# Patient Record
Sex: FEMALE | Race: WHITE | NOT HISPANIC OR LATINO | Employment: FULL TIME | URBAN - METROPOLITAN AREA
[De-identification: names, ages, dates, MRNs, and addresses within clinical notes are randomized per-mention and may not be internally consistent; named-entity substitution may affect disease eponyms.]

---

## 2018-04-29 ENCOUNTER — OFFICE VISIT (OUTPATIENT)
Dept: URGENT CARE | Facility: CLINIC | Age: 53
End: 2018-04-29
Payer: COMMERCIAL

## 2018-04-29 VITALS
DIASTOLIC BLOOD PRESSURE: 81 MMHG | SYSTOLIC BLOOD PRESSURE: 147 MMHG | OXYGEN SATURATION: 99 % | RESPIRATION RATE: 18 BRPM | HEART RATE: 72 BPM | TEMPERATURE: 97.2 F

## 2018-04-29 DIAGNOSIS — B37.9 YEAST INFECTION: ICD-10-CM

## 2018-04-29 DIAGNOSIS — L24.89 IRRITANT CONTACT DERMATITIS DUE TO OTHER AGENTS: Primary | ICD-10-CM

## 2018-04-29 PROCEDURE — 99203 OFFICE O/P NEW LOW 30 MIN: CPT | Performed by: NURSE PRACTITIONER

## 2018-04-29 RX ORDER — VALSARTAN AND HYDROCHLOROTHIAZIDE 320; 25 MG/1; MG/1
TABLET, FILM COATED ORAL
COMMUNITY
Start: 2018-04-18

## 2018-04-29 RX ORDER — FLUCONAZOLE 150 MG/1
150 TABLET ORAL ONCE
Qty: 1 TABLET | Refills: 0 | Status: SHIPPED | OUTPATIENT
Start: 2018-04-29 | End: 2018-04-29

## 2018-04-29 RX ORDER — PREDNISONE 10 MG/1
TABLET ORAL
Qty: 26 TABLET | Refills: 0 | Status: SHIPPED | OUTPATIENT
Start: 2018-04-29

## 2018-04-29 NOTE — PATIENT INSTRUCTIONS
Patient prescribed Diflucan because she states every time she takes prednisone she does get a yeast infection instructed patient not to take Diflucan unless she has symptoms of yeast infection patient verbalized understanding of these instructions  Cold Compress or Soak   AMBULATORY CARE:   What you need to know about a cold compress or soak:  A cold compress or soak helps relieve pain, swelling, and itching  You may need a cold compress or soak to help manage any of the following:  · A sunburn    · Poison ivy or poison oak    · A rash    · A bite or sting by an insect or jellyfish    · A muscle or joint injury, such as a sprain    · A high fever  Contact your healthcare provider if:   · Your symptoms do not improve or you have new symptoms  · You have questions or concerns about your condition or care  How to prepare and use a moist cold compress: Your healthcare provider will tell you how often to apply a cold compress:  · Wash your hands  · Use a washcloth, small towel, or gauze as a cold compress  · You can place the compress under running water or place it in a bowl with cold water  Squeeze extra water out of the compress  · Place the compress directly on the area  · Remove the compress in 10 to 15 minutes or as directed  Gently pat your skin dry with a clean towel  · Wash your hands  · Reapply the compress as many times as directed each day  Use a clean compress every time  How to use a dry cold compress: An ice pack, bag of ice, or bottle filled with cold water can be used as a dry compress  Cover the ice pack or bag of ice with a towel before you apply it to your skin  Leave the compress on your skin for 15 to 20 minutes or as directed  Your healthcare provider will tell you how often to apply the compress each day  How to prepare and use a cold soak:   · Fill a clean container or tub with cold water  The container should be deep enough to cover the area completely  · Remove any bandages  · Soak the area for no longer than 10 minutes  Gently pat your skin dry when you are done soaking  · Replace bandages as directed  · Clean the container or tub when finished  · Wash your hands  Follow up with your healthcare provider as directed:  Write down your questions so you remember to ask them during your visits  © 2017 [employer] Information is for End User's use only and may not be sold, redistributed or otherwise used for commercial purposes  All illustrations and images included in CareNotes® are the copyrighted property of [employer]  or Ryne Mckeon  The above information is an  only  It is not intended as medical advice for individual conditions or treatments  Talk to your doctor, nurse or pharmacist before following any medical regimen to see if it is safe and effective for you

## 2018-04-29 NOTE — PROGRESS NOTES
[facility]        NAME: Leslye Heimlich is a 47 y o  female  : 1965    MRN: [id number]  DATE: 2018  TIME: 1:07 PM    Assessment and Plan   Irritant contact dermatitis due to other agents [L24 89]  1  Irritant contact dermatitis due to other agents  predniSONE 10 mg tablet   2  Yeast infection  fluconazole (DIFLUCAN) 150 mg tablet         Patient Instructions   Patient prescribed Diflucan because she states every time she takes prednisone she does get a yeast infection instructed patient not to take Diflucan unless she has symptoms of yeast infection patient verbalized understanding of these instructions    Follow up with PCP in 3-5 days  Proceed to  ER if symptoms worsen  Chief Complaint     Chief Complaint   Patient presents with    Rash     Pt reports a rash on her legs for a week  History of Present Illness       36-year-old female presents urgent care with chief complaint rash to bilateral lower extremities for the past week  She stated started last week when she was outside in short cleaning out flower bed she did note some poison ivy at that time in her flower bed  She has not used any over-the-counter medications no relief noted      Rash   This is a new problem  The current episode started in the past 7 days  The problem is unchanged  The affected locations include the right lower leg and left lower leg  The rash is characterized by blistering  She was exposed to plant contact  Pertinent negatives include no anorexia, congestion, cough, diarrhea, eye pain, facial edema, fatigue, fever, joint pain, nail changes, rhinorrhea, shortness of breath, sore throat or vomiting  Past treatments include nothing  The treatment provided no relief  Review of Systems   Review of Systems   Constitutional: Negative  Negative for fatigue and fever  HENT: Negative  Negative for congestion, rhinorrhea and sore throat  Eyes: Negative  Negative for pain     Respiratory: Negative  Negative for cough and shortness of breath  Cardiovascular: Negative  Gastrointestinal: Negative  Negative for anorexia, diarrhea and vomiting  Endocrine: Negative  Genitourinary: Negative  Musculoskeletal: Negative  Negative for joint pain  Skin: Positive for rash  Negative for nail changes  Allergic/Immunologic: Negative  Neurological: Negative  Hematological: Negative  Psychiatric/Behavioral: Negative  All other systems reviewed and are negative  Current Medications       Current Outpatient Prescriptions:     sitaGLIPtin (JANUVIA) 25 mg tablet, Take 25 mg by mouth daily, Disp: , Rfl:     fluconazole (DIFLUCAN) 150 mg tablet, Take 1 tablet (150 mg total) by mouth once for 1 dose, Disp: 1 tablet, Rfl: 0    metFORMIN (GLUCOPHAGE) 500 mg tablet, Take 1 tablet by mouth Twice daily, Disp: , Rfl:     predniSONE 10 mg tablet, Take 3 tabs BID X 2 days, 2 tabs BID X 2 days, 1 tab BID X 2 days, 1 tab daily X 2 days, Disp: 26 tablet, Rfl: 0    valsartan-hydrochlorothiazide (DIOVAN-HCT) 320-25 MG per tablet, , Disp: , Rfl:     Current Allergies     Allergies as of 04/29/2018    (Not on File)            The following portions of the patient's history were reviewed and updated as appropriate: allergies, current medications, past family history, past medical history, past social history, past surgical history and problem list      No past medical history on file  No past surgical history on file  No family history on file  Medications have been verified  Objective   /81   Pulse 72   Temp (!) 97 2 °F (36 2 °C)   Resp 18   SpO2 99%        Physical Exam     Physical Exam   Constitutional: She is oriented to person, place, and time  Vital signs are normal  She appears well-developed  HENT:   Head: Normocephalic and atraumatic     Right Ear: External ear normal    Left Ear: External ear normal    Nose: Nose normal    Mouth/Throat: Oropharynx is clear and moist    Eyes: Conjunctivae and EOM are normal  Pupils are equal, round, and reactive to light  Lids are everted and swept, no foreign bodies found  Neck: Normal range of motion  Neck supple  Cardiovascular: Normal rate, regular rhythm, normal heart sounds and intact distal pulses  Pulmonary/Chest: Effort normal and breath sounds normal    Abdominal: Normal appearance  Musculoskeletal: Normal range of motion  Neurological: She is alert and oriented to person, place, and time  She has normal reflexes  Skin: Skin is warm, dry and intact  Rash noted  Rash is maculopapular  Psychiatric: She has a normal mood and affect   Her speech is normal and behavior is normal  Judgment and thought content normal

## 2019-02-23 ENCOUNTER — OFFICE VISIT (OUTPATIENT)
Dept: URGENT CARE | Facility: CLINIC | Age: 54
End: 2019-02-23
Payer: COMMERCIAL

## 2019-02-23 VITALS
RESPIRATION RATE: 20 BRPM | WEIGHT: 162 LBS | TEMPERATURE: 98.5 F | OXYGEN SATURATION: 98 % | HEIGHT: 61 IN | DIASTOLIC BLOOD PRESSURE: 80 MMHG | HEART RATE: 80 BPM | SYSTOLIC BLOOD PRESSURE: 130 MMHG | BODY MASS INDEX: 30.58 KG/M2

## 2019-02-23 DIAGNOSIS — J06.9 ACUTE URI: Primary | ICD-10-CM

## 2019-02-23 PROCEDURE — 99213 OFFICE O/P EST LOW 20 MIN: CPT | Performed by: PHYSICIAN ASSISTANT

## 2019-02-23 NOTE — PROGRESS NOTES
[facility]    NAME: Kecia Giraldo is a 47 y o  female  : 1965    MRN: [id number]  DATE: 2019  TIME: 3:01 PM    Assessment and Plan   Acute URI [J06 9]  1  Acute URI         Patient Instructions     Patient Instructions   Infection appears viral   Recommend symptomatic treatment  Can take ibuprofen or tylenol as needed for pain or fever  Over the counter cough and cold medications to help with symptoms  Use salt water gargles for sore throat and throat lozenges  Cough drops as needed  Wash hands frequently to prevent the spread of infection  If not improving over the next 7-10 days, follow up with PCP  Symptoms may persist for 10-14 days  Chief Complaint     Chief Complaint   Patient presents with    Cough     cough,sinus congestion for 1 day       History of Present Illness   71-year-old female here with cold symptoms for the last day  Has had nasal congestion, sore throat and a slight cough is  Also reports having some body aches  No fever  Cough is dry nonproductive  Review of Systems   Review of Systems   Constitutional: Negative for activity change, appetite change, chills, diaphoresis, fatigue, fever and unexpected weight change  HENT: Positive for congestion and sore throat  Negative for dental problem, hearing loss, sinus pressure, sneezing, tinnitus, trouble swallowing and voice change  Eyes: Negative for photophobia, redness and visual disturbance  Respiratory: Positive for cough  Negative for apnea, chest tightness, shortness of breath, wheezing and stridor  Cardiovascular: Negative for chest pain, palpitations and leg swelling  Gastrointestinal: Negative for abdominal distention, abdominal pain, blood in stool, constipation, diarrhea, nausea and vomiting  Endocrine: Negative for cold intolerance, heat intolerance, polydipsia, polyphagia and polyuria     Genitourinary: Negative for difficulty urinating, dysuria, flank pain, frequency, hematuria and urgency  Musculoskeletal: Positive for myalgias  Negative for arthralgias, back pain, gait problem, joint swelling, neck pain and neck stiffness  Skin: Negative for pallor, rash and wound  Neurological: Negative for dizziness, tremors, seizures, speech difficulty, weakness and headaches  Hematological: Negative for adenopathy  Does not bruise/bleed easily  Psychiatric/Behavioral: Negative for agitation, confusion, dysphoric mood and sleep disturbance  The patient is not nervous/anxious  All other systems reviewed and are negative  Current Medications     Current Outpatient Medications:     metFORMIN (GLUCOPHAGE) 500 mg tablet, Take 1 tablet by mouth Twice daily, Disp: , Rfl:     sitaGLIPtin (JANUVIA) 25 mg tablet, Take 25 mg by mouth daily, Disp: , Rfl:     valsartan-hydrochlorothiazide (DIOVAN-HCT) 320-25 MG per tablet, , Disp: , Rfl:     predniSONE 10 mg tablet, Take 3 tabs BID X 2 days, 2 tabs BID X 2 days, 1 tab BID X 2 days, 1 tab daily X 2 days (Patient not taking: Reported on 2/23/2019), Disp: 26 tablet, Rfl: 0    Current Allergies     Allergies as of 02/23/2019    (No Known Allergies)          The following portions of the patient's history were reviewed and updated as appropriate: allergies, current medications, past family history, past medical history, past social history, past surgical history and problem list    Past Medical History:   Diagnosis Date    Diabetes mellitus ([address] )     Hypertension      Past Surgical History:   Procedure Laterality Date    HYSTERECTOMY      ROTATOR CUFF REPAIR       History reviewed  No pertinent family history    Social History     Socioeconomic History    Marital status: /Civil Union     Spouse name: Not on file    Number of children: Not on file    Years of education: Not on file    Highest education level: Not on file   Occupational History    Not on file   Social Needs    Financial resource strain: Not on file   Eugenio Caro Food insecurity:     Worry: Not on file     Inability: Not on file    Transportation needs:     Medical: Not on file     Non-medical: Not on file   Tobacco Use    Smoking status: Never Smoker    Smokeless tobacco: Never Used   Substance and Sexual Activity    Alcohol use: Not on file    Drug use: Not on file    Sexual activity: Not on file   Lifestyle    Physical activity:     Days per week: Not on file     Minutes per session: Not on file    Stress: Not on file   Relationships    Social connections:     Talks on phone: Not on file     Gets together: Not on file     Attends [employer] service: Not on file     Active member of club or organization: Not on file     Attends meetings of clubs or organizations: Not on file     Relationship status: Not on file    Intimate partner violence:     Fear of current or ex partner: Not on file     Emotionally abused: Not on file     Physically abused: Not on file     Forced sexual activity: Not on file   Other Topics Concern    Not on file   Social History Narrative    Not on file     Medications have been verified  Objective   /80   Pulse 80   Temp 98 5 °F (36 9 °C) (Tympanic)   Resp 20   Ht 5' 1" (1 549 m)   Wt 73 5 kg (162 lb)   SpO2 98%   BMI 30 61 kg/m²      Physical Exam   Physical Exam   Constitutional: She appears well-developed and well-nourished  No distress  HENT:   Head: Normocephalic  Right Ear: Tympanic membrane and external ear normal    Left Ear: Tympanic membrane and external ear normal    Nose: Mucosal edema present  Mouth/Throat: Posterior oropharyngeal erythema (Mild) present  No oropharyngeal exudate  Neck: Normal range of motion  Neck supple  Cardiovascular: Normal rate, regular rhythm and normal heart sounds  No murmur heard  Pulmonary/Chest: Effort normal and breath sounds normal  No respiratory distress  She has no wheezes  She has no rales  Abdominal: Soft  Bowel sounds are normal  There is no tenderness  Musculoskeletal: Normal range of motion  Lymphadenopathy:     She has no cervical adenopathy  Skin: Skin is warm  No rash noted  Nursing note and vitals reviewed

## 2021-09-01 ENCOUNTER — OFFICE VISIT (OUTPATIENT)
Dept: OBGYN CLINIC | Facility: CLINIC | Age: 56
End: 2021-09-01
Payer: COMMERCIAL

## 2021-09-01 VITALS — DIASTOLIC BLOOD PRESSURE: 79 MMHG | BODY MASS INDEX: 30.61 KG/M2 | HEIGHT: 61 IN | SYSTOLIC BLOOD PRESSURE: 127 MMHG

## 2021-09-01 DIAGNOSIS — M17.12 PRIMARY OSTEOARTHRITIS OF LEFT KNEE: ICD-10-CM

## 2021-09-01 DIAGNOSIS — M25.562 PAIN AND SWELLING OF KNEE, LEFT: Primary | ICD-10-CM

## 2021-09-01 DIAGNOSIS — M25.462 PAIN AND SWELLING OF KNEE, LEFT: Primary | ICD-10-CM

## 2021-09-01 LAB
APPEARANCE FLD: ABNORMAL
COLOR FLD: ABNORMAL
CRYSTALS SNV QL MICRO: NORMAL
LYMPHOCYTES # SNV MANUAL: 11 %
MONOCYTES NFR SNV MANUAL: 3 %
NEUTROPHILS NFR SNV MANUAL: 81 %
NEUTS BAND NFR SNV: 5 %
SITE: ABNORMAL
TOTAL CELLS COUNTED SPEC: 100
WBC # FLD MANUAL: ABNORMAL /UL (ref 0–200)

## 2021-09-01 PROCEDURE — 87205 SMEAR GRAM STAIN: CPT | Performed by: FAMILY MEDICINE

## 2021-09-01 PROCEDURE — 20610 DRAIN/INJ JOINT/BURSA W/O US: CPT | Performed by: FAMILY MEDICINE

## 2021-09-01 PROCEDURE — 99204 OFFICE O/P NEW MOD 45 MIN: CPT | Performed by: FAMILY MEDICINE

## 2021-09-01 PROCEDURE — 89060 EXAM SYNOVIAL FLUID CRYSTALS: CPT | Performed by: FAMILY MEDICINE

## 2021-09-01 PROCEDURE — 89051 BODY FLUID CELL COUNT: CPT | Performed by: FAMILY MEDICINE

## 2021-09-01 PROCEDURE — 87070 CULTURE OTHR SPECIMN AEROBIC: CPT | Performed by: FAMILY MEDICINE

## 2021-09-01 RX ORDER — LIDOCAINE HYDROCHLORIDE 10 MG/ML
4 INJECTION, SOLUTION INFILTRATION; PERINEURAL
Status: COMPLETED | OUTPATIENT
Start: 2021-09-01 | End: 2021-09-01

## 2021-09-01 RX ORDER — METHYLPREDNISOLONE ACETATE 40 MG/ML
1 INJECTION, SUSPENSION INTRA-ARTICULAR; INTRALESIONAL; INTRAMUSCULAR; SOFT TISSUE
Status: COMPLETED | OUTPATIENT
Start: 2021-09-01 | End: 2021-09-01

## 2021-09-01 RX ORDER — NAPROXEN 500 MG/1
TABLET ORAL
COMMUNITY
Start: 2021-08-27

## 2021-09-01 RX ADMIN — LIDOCAINE HYDROCHLORIDE 4 ML: 10 INJECTION, SOLUTION INFILTRATION; PERINEURAL at 09:57

## 2021-09-01 RX ADMIN — METHYLPREDNISOLONE ACETATE 1 ML: 40 INJECTION, SUSPENSION INTRA-ARTICULAR; INTRALESIONAL; INTRAMUSCULAR; SOFT TISSUE at 09:57

## 2021-09-01 NOTE — PROGRESS NOTES
[facility] [address] [facility]  [address]  [id number] [place] [id number] [phone]      Chief Complaint:  Chief Complaint   Patient presents with    Left Knee - Pain       Vitals:  /79 (BP Location: Left arm, Patient Position: Sitting, Cuff Size: Standard)   Ht 5' 1" (1 549 m)   BMI 30 61 kg/m²     The following portions of the patient's history were reviewed and updated as appropriate: allergies, current medications, past family history, past medical history, past social history, past surgical history, and problem list       Subjective:   Patient ID: Sara Ratliff is a 55 y o  female  Here c/o L knee pain and swelling  Seen at [facility] XR done  Notes reviewed  She was pushing her  last Tuesday and she went to push her husbands wheelchair which still had the break on- this is the only thing she thinks might have caused the pain  She has been having L knee pain for about 6 months  Denies any injury  Wore brace  Ibuprofen/icing- helping  Knee swelled  Cant walk on  Sharp pain    Indication: Acute pain of left knee  Patient states cannot bear weight on left  knee and cannot bend it since yesterday  4 views of the left knee do not reveal a fracture or dislocation  There is mild  narrowing of the medial tibiofemoral joint with mild hypertrophic spurring about  the medial tibiofemoral joint consistent with mild osteoarthritis  Patellofemoral joint appears maintained but there is hypertrophic spurring  involving the posterior patella consistent with mild osteoarthritis  There is  fullness of the suprapatellar bursa suggesting a joint effusion  IMPRESSION:  Impression: Mild osteoarthritis  Suspected joint effusion  Review of Systems   Constitutional: Negative for fatigue and fever  Respiratory: Negative for shortness of breath  Cardiovascular: Negative for chest pain  Gastrointestinal: Negative for abdominal pain and nausea     Genitourinary: Negative for dysuria  Musculoskeletal: Positive for arthralgias, gait problem and joint swelling  Skin: Negative for rash and wound  Neurological: Negative for weakness and headaches  Objective:  Left Knee Exam     Tenderness   The patient is experiencing tenderness in the medial joint line  Range of Motion   Extension: abnormal   Flexion: abnormal     Tests   Yenni:  Medial - positive Lateral - negative  Varus: negative Valgus: negative    Other   Swelling: moderate  Effusion: effusion present          Observations   Left Knee   Positive for effusion  Physical Exam  Vitals and nursing note reviewed  Constitutional:       Appearance: Normal appearance  She is well-developed  HENT:      Head: Normocephalic  Mouth/Throat:      Mouth: Mucous membranes are moist    Eyes:      Extraocular Movements: Extraocular movements intact  Cardiovascular:      Rate and Rhythm: Normal rate and regular rhythm  Heart sounds: Normal heart sounds  Pulmonary:      Effort: Pulmonary effort is normal       Breath sounds: Normal breath sounds  Abdominal:      General: Bowel sounds are normal       Palpations: Abdomen is soft  Musculoskeletal:         General: Swelling and tenderness present  Cervical back: Normal range of motion  Left knee: Effusion present  Instability Tests: Medial Yenni test positive  Lateral Yenni test negative  Skin:     General: Skin is warm and dry  Neurological:      General: No focal deficit present  Mental Status: She is alert and oriented to person, place, and time  Psychiatric:         Mood and Affect: Mood normal          Behavior: Behavior normal          Thought Content: Thought content normal          Large joint arthrocentesis: L knee  Universal Protocol:  Consent: Verbal consent obtained    Risks and benefits: risks, benefits and alternatives were discussed  Consent given by: patient  Time out: Immediately prior to procedure a "time out" was called to verify the correct patient, procedure, equipment, support staff and site/side marked as required  Timeout called at: 9/1/2021 9:52 AM   Site marked: the operative site was marked  Supporting Documentation  Indications: pain   Procedure Details  Location: knee - L knee  Preparation: Patient was prepped and draped in the usual sterile fashion  Needle size: 18 G  Ultrasound guidance: no  Approach: anterolateral  Medications administered: 4 mL lidocaine 1 %; 1 mL methylPREDNISolone acetate 40 mg/mL    Aspirate amount: 50 mL  Aspirate: serous and blood-tinged  Analysis: fluid sample sent for laboratory analysis    Patient tolerance: patient tolerated the procedure well with no immediate complications  Dressing:  Sterile dressing applied             Assessment/Plan:  Assessment/Plan   Diagnoses and all orders for this visit:    Pain and swelling of knee, left  -     Synovial fluid, crystal; Future  -     Synovial fluid white cell count w/ diff; Future  -     Body fluid culture and Gram stain; Future    Primary osteoarthritis of left knee    Other orders  -     naproxen (NAPROSYN) 500 mg tablet; TAKE 1 TABLET BY MOUTH TWICE DAILY WITH MEALS FOR 10 DAYS  -     Large joint arthrocentesis        Return in about 1 week (around 9/8/2021) for Recheck       Jeb Mullins MD

## 2021-09-01 NOTE — PATIENT INSTRUCTIONS
F/u 1 wk  Icing/OTC pain meds as needed  Weight bearing as tolerated  Patient has walker and wheelchair  Declines crutches  Knee aspiration/injection done today    Synovial fluid labs sent

## 2021-09-04 LAB
BACTERIA SPEC BFLD CULT: NO GROWTH
GRAM STN SPEC: NORMAL
GRAM STN SPEC: NORMAL

## 2021-09-08 ENCOUNTER — OFFICE VISIT (OUTPATIENT)
Dept: OBGYN CLINIC | Facility: CLINIC | Age: 56
End: 2021-09-08
Payer: COMMERCIAL

## 2021-09-08 VITALS
WEIGHT: 162 LBS | DIASTOLIC BLOOD PRESSURE: 79 MMHG | SYSTOLIC BLOOD PRESSURE: 123 MMHG | HEIGHT: 61 IN | BODY MASS INDEX: 30.58 KG/M2 | TEMPERATURE: 98.9 F | HEART RATE: 80 BPM

## 2021-09-08 DIAGNOSIS — S83.207A POSITIVE MCMURRAY TEST OF LEFT KNEE, INITIAL ENCOUNTER: ICD-10-CM

## 2021-09-08 DIAGNOSIS — F40.240 CLAUSTROPHOBIA: ICD-10-CM

## 2021-09-08 DIAGNOSIS — M25.462 PAIN AND SWELLING OF KNEE, LEFT: ICD-10-CM

## 2021-09-08 DIAGNOSIS — M11.262 PSEUDOGOUT OF KNEE, LEFT: Primary | ICD-10-CM

## 2021-09-08 DIAGNOSIS — M25.562 PAIN AND SWELLING OF KNEE, LEFT: ICD-10-CM

## 2021-09-08 PROCEDURE — 99214 OFFICE O/P EST MOD 30 MIN: CPT | Performed by: FAMILY MEDICINE

## 2021-09-08 RX ORDER — LORAZEPAM 0.5 MG/1
0.5 TABLET ORAL
Qty: 1 TABLET | Refills: 0 | Status: SHIPPED | OUTPATIENT
Start: 2021-09-08

## 2021-09-08 RX ORDER — NAPROXEN 500 MG/1
500 TABLET ORAL 2 TIMES DAILY PRN
Qty: 60 TABLET | Refills: 1 | Status: SHIPPED | OUTPATIENT
Start: 2021-09-08

## 2021-09-08 RX ORDER — COLCHICINE 0.6 MG/1
0.6 TABLET ORAL 2 TIMES DAILY
Qty: 60 TABLET | Refills: 6 | Status: SHIPPED | OUTPATIENT
Start: 2021-09-08

## 2021-09-08 NOTE — PROGRESS NOTES
[address] [facility] [place]  [address]  [address] [employer] [id number] [phone]      Chief Complaint:  Chief Complaint   Patient presents with    Left Knee - Follow-up       Vitals:  /79   Pulse 80   Temp 98 9 °F (37 2 °C) (Tympanic)   Ht 5' 1" (1 549 m)   Wt 73 5 kg (162 lb)   BMI 30 61 kg/m²     The following portions of the patient's history were reviewed and updated as appropriate: allergies, current medications, past family history, past medical history, past social history, past surgical history, and problem list       Subjective:   Patient ID: Bethel Michael is a 55 y o  female  Here for f/u  L knee pain and swelling  She has had similar episodes about 20 x ? Swells up, red, painful, then resolves  Wore knee sleeve  Ibuprofen/icing- helping  Knee swelling but less  Less pain with walking  achey pain at night  Using crutches  She was pushing her  up an incline in a wheelchair and twisted her L knee  Review of Systems   Constitutional: Negative for fatigue and fever  Respiratory: Negative for shortness of breath  Cardiovascular: Negative for chest pain  Gastrointestinal: Negative for abdominal pain and nausea  Genitourinary: Negative for dysuria  Musculoskeletal: Positive for arthralgias, gait problem and joint swelling  Skin: Negative for rash and wound  Neurological: Negative for weakness and headaches  Objective:  Left Knee Exam     Tenderness   The patient is experiencing tenderness in the medial joint line  Range of Motion   Extension: abnormal   Flexion: abnormal     Tests   Yenni:  Medial - positive     Other   Swelling: moderate            Physical Exam  Constitutional:       Appearance: Normal appearance  She is normal weight  Eyes:      Extraocular Movements: Extraocular movements intact     Pulmonary:      Effort: Pulmonary effort is normal    Musculoskeletal:         General: Swelling and tenderness present  Cervical back: Normal range of motion  Left knee:      Instability Tests: Medial Yenni test positive  Skin:     General: Skin is warm and dry  Neurological:      General: No focal deficit present  Mental Status: She is alert and oriented to person, place, and time  Mental status is at baseline  Psychiatric:         Mood and Affect: Mood normal          Behavior: Behavior normal          Thought Content: Thought content normal          Judgment: Judgment normal      XR-  L knee- reviewed by me  Medial joint space narrowing, mod DJD/          Assessment/Plan:  Assessment/Plan   Diagnoses and all orders for this visit:    Pseudogout of knee, left  -     XR knee 3 vw left non injury; Future  -     naproxen (NAPROSYN) 500 mg tablet; Take 1 tablet (500 mg total) by mouth 2 (two) times a day as needed for mild pain  -     colchicine (COLCRYS) 0 6 mg tablet; Take 1 tablet (0 6 mg total) by mouth 2 (two) times a day    Pain and swelling of knee, left  -     naproxen (NAPROSYN) 500 mg tablet; Take 1 tablet (500 mg total) by mouth 2 (two) times a day as needed for mild pain  -     colchicine (COLCRYS) 0 6 mg tablet; Take 1 tablet (0 6 mg total) by mouth 2 (two) times a day  -     MRI knee left  wo contrast; Future  -     LORazepam (ATIVAN) 0 5 mg tablet; Take 1 tablet (0 5 mg total) by mouth 30 min pre-procedure    Positive Yenni test of left knee, initial encounter  -     MRI knee left  wo contrast; Future  -     LORazepam (ATIVAN) 0 5 mg tablet; Take 1 tablet (0 5 mg total) by mouth 30 min pre-procedure    Claustrophobia  -     LORazepam (ATIVAN) 0 5 mg tablet; Take 1 tablet (0 5 mg total) by mouth 30 min pre-procedure        Return in about 2 weeks (around 9/22/2021) for Recheck       Opal Olivares MD

## 2021-09-08 NOTE — PATIENT INSTRUCTIONS
F/u after MRI   MRI- L knee- L knee pain, fall/twist/effusion/positive [facility] test  R/o meniscal tear  xr neg  Consider aspiration of L knee- patient prefers to wait due to painful procedure  Continue knee compression/icing  Refill naproxen  Begin Colchicine 0 6 mg 2x daily, if having stomach/bowel issues, may decrease to 1 x daily

## 2021-09-13 ENCOUNTER — TELEPHONE (OUTPATIENT)
Dept: OBGYN CLINIC | Facility: HOSPITAL | Age: 56
End: 2021-09-13

## 2021-09-13 ENCOUNTER — TELEPHONE (OUTPATIENT)
Dept: OBGYN CLINIC | Facility: CLINIC | Age: 56
End: 2021-09-13

## 2021-09-13 NOTE — TELEPHONE ENCOUNTER
Patient sees Dr Sarah Morris  She called and left a voicemail stating that one of the medications that she was put on was making her sick  She is asking for a call back

## 2021-09-28 ENCOUNTER — OFFICE VISIT (OUTPATIENT)
Dept: OBGYN CLINIC | Facility: CLINIC | Age: 56
End: 2021-09-28
Payer: COMMERCIAL

## 2021-09-28 VITALS
HEART RATE: 82 BPM | SYSTOLIC BLOOD PRESSURE: 136 MMHG | DIASTOLIC BLOOD PRESSURE: 78 MMHG | HEIGHT: 64 IN | WEIGHT: 149 LBS | TEMPERATURE: 98.7 F | BODY MASS INDEX: 25.44 KG/M2

## 2021-09-28 DIAGNOSIS — M25.462 PAIN AND SWELLING OF KNEE, LEFT: ICD-10-CM

## 2021-09-28 DIAGNOSIS — S83.242D OTHER TEAR OF MEDIAL MENISCUS OF LEFT KNEE AS CURRENT INJURY, SUBSEQUENT ENCOUNTER: ICD-10-CM

## 2021-09-28 DIAGNOSIS — M25.562 PAIN AND SWELLING OF KNEE, LEFT: ICD-10-CM

## 2021-09-28 DIAGNOSIS — M11.262 PSEUDOGOUT OF KNEE, LEFT: Primary | ICD-10-CM

## 2021-09-28 PROCEDURE — 99214 OFFICE O/P EST MOD 30 MIN: CPT | Performed by: FAMILY MEDICINE

## 2021-09-28 PROCEDURE — 20610 DRAIN/INJ JOINT/BURSA W/O US: CPT | Performed by: FAMILY MEDICINE

## 2021-09-28 RX ORDER — LIDOCAINE HYDROCHLORIDE 10 MG/ML
4 INJECTION, SOLUTION INFILTRATION; PERINEURAL
Status: COMPLETED | OUTPATIENT
Start: 2021-09-28 | End: 2021-09-28

## 2021-09-28 RX ADMIN — LIDOCAINE HYDROCHLORIDE 4 ML: 10 INJECTION, SOLUTION INFILTRATION; PERINEURAL at 11:44

## 2021-09-28 NOTE — PROGRESS NOTES
[address] [facility]  [address]  [address] [facility] [address] [phone]      Chief Complaint:  Chief Complaint   Patient presents with    Left Knee - Pain       Vitals:  /78   Pulse 82   Temp 98 7 °F (37 1 °C)   Ht 5' 4" (1 626 m)   Wt 67 6 kg (149 lb)   BMI 25 58 kg/m²     The following portions of the patient's history were reviewed and updated as appropriate: allergies, current medications, past family history, past medical history, past social history, past surgical history, and problem list       Subjective:   Patient ID: Bethel Michael is a 55 y o  female  Here for f/u L knee pain/MRI  She has had similar episodes about 20 x ? Swells up, red, painful, then resolves  Wore knee sleeve  Ibuprofen/icing- helping  Having less pain  Feels weird- swollen  Needing crutches to walk  achey pain at night but less    MRI- L knee- synovitis, posterior horn meniscal tear  Review of Systems   Constitutional: Negative for fatigue and fever  Respiratory: Negative for shortness of breath  Cardiovascular: Negative for chest pain  Gastrointestinal: Negative for abdominal pain and nausea  Genitourinary: Negative for dysuria  Musculoskeletal: Positive for arthralgias, gait problem and joint swelling  Skin: Negative for rash and wound  Neurological: Negative for weakness and headaches  Objective:  Left Knee Exam     Tenderness   The patient is experiencing tenderness in the medial joint line  Range of Motion   The patient has normal left knee ROM  Tests   Yenni:  Medial - positive Lateral - negative  Varus: negative Valgus: negative    Other   Swelling: moderate  Effusion: effusion present          Observations   Left Knee   Positive for effusion  Physical Exam  Constitutional:       Appearance: Normal appearance  She is normal weight  HENT:      Head: Normocephalic     Eyes:      Extraocular Movements: Extraocular movements intact  Pulmonary:      Effort: Pulmonary effort is normal    Musculoskeletal:         General: Swelling and tenderness present  Cervical back: Normal range of motion  Left knee: Effusion present  Instability Tests: Medial Yenni test positive  Lateral Yenni test negative  Skin:     General: Skin is warm and dry  Neurological:      General: No focal deficit present  Mental Status: She is alert and oriented to person, place, and time  Mental status is at baseline  Psychiatric:         Mood and Affect: Mood normal          Behavior: Behavior normal          Thought Content: Thought content normal          Judgment: Judgment normal            I have personally reviewed pertinent films in PACS and my interpretation is MRI L knee- posterior horn medial meniscus tear  synoviits         Assessment/Plan:  Assessment/Plan   Diagnoses and all orders for this visit:    Pseudogout of knee, left  -     Ambulatory referral to Rheumatology; Future    Pain and swelling of knee, left  -     Ambulatory referral to Rheumatology; Future    Other tear of medial meniscus of left knee as current injury, subsequent encounter  -     Ambulatory referral to Rheumatology; Future    Other orders  -     Large joint arthrocentesis: L knee      Large joint arthrocentesis: L knee  Universal Protocol:  Consent: Verbal consent obtained  Risks and benefits: risks, benefits and alternatives were discussed  Consent given by: patient  Time out: Immediately prior to procedure a "time out" was called to verify the correct patient, procedure, equipment, support staff and site/side marked as required    Timeout called at: 9/28/2021 11:46 AM   Site marked: the operative site was marked  Supporting Documentation  Indications: pain   Procedure Details  Location: knee - L knee  Preparation: Patient was prepped and draped in the usual sterile fashion  Needle size: 18 G  Ultrasound guidance: no  Approach: anterolateral  Medications administered: 4 mL lidocaine 1 %    Aspirate amount: 25 mL  Aspirate: bloody (with crystals)    Patient tolerance: patient tolerated the procedure well with no immediate complications  Dressing:  Sterile dressing applied        Return if symptoms worsen or fail to improve       Chelsea Lowery MD

## 2021-09-28 NOTE — PATIENT INSTRUCTIONS
F/u here as needed  Referral to Rheumatology  Icing/heat/OTC pain meds as needed  Aspiration of L knee done today    Crutches/activity as tolerated